# Patient Record
Sex: FEMALE | Race: AMERICAN INDIAN OR ALASKA NATIVE
[De-identification: names, ages, dates, MRNs, and addresses within clinical notes are randomized per-mention and may not be internally consistent; named-entity substitution may affect disease eponyms.]

---

## 2022-01-04 ENCOUNTER — HOSPITAL ENCOUNTER (EMERGENCY)
Dept: HOSPITAL 5 - ED | Age: 62
LOS: 1 days | Discharge: HOME | End: 2022-01-05
Payer: OTHER GOVERNMENT

## 2022-01-04 VITALS — SYSTOLIC BLOOD PRESSURE: 136 MMHG | DIASTOLIC BLOOD PRESSURE: 75 MMHG
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
## 2022-01-04 DIAGNOSIS — S62.618B: Primary | ICD-10-CM

## 2022-01-04 PROCEDURE — 99282 EMERGENCY DEPT VISIT SF MDM: CPT

## 2022-01-04 NOTE — EMERGENCY DEPARTMENT REPORT
ED Animal Bite HPI





- General


Chief Complaint: Extremity Injury, Upper


Stated Complaint: DOG BITE


Time Seen by Provider: 01/04/22 14:12


Source: patient


Mode of arrival: Ambulatory


Limitations: No Limitations





- History of Present Illness


Initial Comments: 





61-year-old -American female presents to the emergency room stating that 

she has been bitten by her dog while she was breaking up them from fighting.  

She states that her right hand was injured.  Patient went to [facility] and was 

noted to have an open fracture of her right middle proximal phalanx.  The wound 

dressed it and sent her over to be evaluated.  Patient reports that she has no 

known drug allergies.  She states she is up-to-date on her tetanus shot.


MD Complaint: animal bite


-: This afternoon


Right: Hand


Animal: dog


Description: household pet


Context: animals fighting


Associated Symptoms: bleeding


Treatments Prior to Arrival: wound dressing(s), irrigation





- Related Data


                                  Previous Rx's











 Medication  Instructions  Recorded  Last Taken  Type


 


Amoxicillin/Potassium Clav 1 each PO BID #20 tablet 01/04/22 Unknown Rx





[Augmentin 875-125 Tablet]    


 


cephALEXin [Keflex] 500 mg PO Q8HR #30 cap 01/04/22 Unknown Rx


 


traMADoL [Ultram 50 MG tab] 50 mg PO Q6HR PRN #12 tablet 01/04/22 Unknown Rx











                                    Allergies











Allergy/AdvReac Type Severity Reaction Status Date / Time


 


No Known Allergies Allergy   Unverified 01/04/22 14:35














ED Review of Systems


ROS: 


Stated complaint: DOG BITE


Other details as noted in HPI





Comment: All other systems reviewed and negative


Constitutional: no symptoms reported





ED Past Medical Hx





- Medications


Home Medications: 


                                Home Medications











 Medication  Instructions  Recorded  Confirmed  Last Taken  Type


 


Amoxicillin/Potassium Clav 1 each PO BID #20 tablet 01/04/22  Unknown Rx





[Augmentin 875-125 Tablet]     


 


cephALEXin [Keflex] 500 mg PO Q8HR #30 cap 01/04/22  Unknown Rx


 


traMADoL [Ultram 50 MG tab] 50 mg PO Q6HR PRN #12 tablet 01/04/22  Unknown Rx














ED Physical Exam





- General


Limitations: No Limitations


General appearance: alert, in no apparent distress





- Head


Head exam: Present: atraumatic, normocephalic





- Eye


Eye exam: Present: normal appearance





- ENT


ENT exam: Present: normal external ear exam





- Neck


Neck exam: Present: normal inspection, full ROM





- Respiratory


Respiratory exam: Absent: respiratory distress, accessory muscle use





- Cardiovascular


Cardiovascular Exam: Present: regular rate





- Back Exam


Back exam: Present: normal inspection





- Neurological Exam


Neurological exam: Present: alert, oriented X3, normal gait





- Psychiatric


Psychiatric exam: Present: normal affect, normal mood





ED Course


                                   Vital Signs











  01/04/22





  13:51


 


Temperature 98.6 F


 


Pulse Rate 75


 


Respiratory 15





Rate 


 


Blood Pressure 136/75


 


O2 Sat by Pulse 97





Oximetry 











Critical care attestation.: 


If time is entered above; I have spent that time in minutes in the direct care 

of this critically ill patient, excluding procedure time.








ED Disposition


Clinical Impression: 


Dog bite of hand


Qualifiers:


 Encounter type: initial encounter Laterality: right Qualified Code(s): S61.451A

- Open bite of right hand, initial encounter; W54.0XXA - Bitten by dog, initial 

encounter





Proximal phalanx fracture of finger


Qualifiers:


 Encounter type: initial encounter Finger: middle finger Fracture type: open 

Fracture alignment: displaced Laterality: unspecified laterality Qualified 

Code(s): S62.618B - Displaced fracture of proximal phalanx of other finger, 

initial encounter for open fracture





Disposition: 01 HOME / SELF CARE / HOMELESS


Is pt being admited?: No


Does the pt Need Aspirin: No


Condition: Stable


Instructions:  Animal Bite, Adult, Easy-to-Read, Finger Fracture, Adult, 

Easy-to-Read, Cast or Splint Care, Adult, Easy-to-Read


Additional Instructions: 


Please complete both antibiotics as prescribed.  Pain medication as needed.  Do 

not operate heavy machinery while taking pain medicine.  Is very important to 

increase your fluid intake.  Wear your splint.  Follow-up with an orthopedist in

the next 2 to 3 days.  I have listed several below for your convenience.


Prescriptions: 


Amoxicillin/Potassium Clav [Augmentin 875-125 Tablet] 1 each PO BID #20 tablet


cephALEXin [Keflex] 500 mg PO Q8HR #30 cap


traMADoL [Ultram 50 MG tab] 50 mg PO Q6HR PRN #12 tablet


 PRN Reason: Pain


Referrals: 


FABIANA BOYD MD [Staff Physician] - 3-5 Days


[facility] [Provider Group] - 3-5 Days


Forms:  Work/School Release Form(ED)


Time of Disposition: 14:41





ED Medical Decision Making





- Medical Decision Making





61-year-old -American female presents to the emergency room stating that 

she has been bitten by her dog while she was breaking up them from fighting.  

She states that her right hand was injured.  Patient went to [facility] and was 

noted to have an open fracture of her right middle proximal phalanx.  The wound 

dressed it and sent her over to be evaluated.  Patient reports that she has no 

known drug allergies.  She states she is up-to-date on her tetanus shot.








Patient will be referred to orthopedist.  She will be placed on Keflex Augmentin

placed in a finger splint and pain medication.  Patient is instructed to keep 

wound clean and dry.

## 2024-05-29 ENCOUNTER — OFFICE VISIT (OUTPATIENT)
Dept: URBAN - METROPOLITAN AREA CLINIC 118 | Facility: CLINIC | Age: 64
End: 2024-05-29

## 2024-07-22 ENCOUNTER — DASHBOARD ENCOUNTERS (OUTPATIENT)
Age: 64
End: 2024-07-22

## 2024-07-22 ENCOUNTER — OFFICE VISIT (OUTPATIENT)
Dept: URBAN - METROPOLITAN AREA CLINIC 118 | Facility: CLINIC | Age: 64
End: 2024-07-22
Payer: OTHER GOVERNMENT

## 2024-07-22 VITALS
TEMPERATURE: 97 F | HEIGHT: 67 IN | WEIGHT: 175.8 LBS | HEART RATE: 77 BPM | BODY MASS INDEX: 27.59 KG/M2 | DIASTOLIC BLOOD PRESSURE: 76 MMHG | SYSTOLIC BLOOD PRESSURE: 135 MMHG

## 2024-07-22 DIAGNOSIS — Z12.11 ENCOUNTER FOR SCREENING FOR MALIGNANT NEOPLASM OF COLON: ICD-10-CM

## 2024-07-22 PROCEDURE — 99203 OFFICE O/P NEW LOW 30 MIN: CPT | Performed by: INTERNAL MEDICINE

## 2024-07-22 RX ORDER — ERGOCALCIFEROL 1.25 MG/1
CAPSULE ORAL
Qty: 0 | Refills: 0 | Status: ACTIVE | COMMUNITY
Start: 1900-01-01

## 2024-07-22 NOTE — HPI-TODAY'S VISIT:
Patient here for routine colonoscopy evaluation. The patient denies specific GI complaints with no rectal bleeding, abdominal pain, or change in bowel habits  colon 6 years ago  Two sessile polyps were found in the rectum. The polyps were 3 to 5 mm in size. These polyps were removed with a hot snare. Resection and retrieval were complete. Internal hemorrhoids were found during retroflexion. The hemorrhoids were medium-sized. The exam was otherwise normal throughout the examined colon. was recommended repeat 5 years polyps were hyperplastic

## 2024-08-27 ENCOUNTER — OFFICE VISIT (OUTPATIENT)
Dept: URBAN - METROPOLITAN AREA SURGERY CENTER 23 | Facility: SURGERY CENTER | Age: 64
End: 2024-08-27

## 2024-09-03 ENCOUNTER — OFFICE VISIT (OUTPATIENT)
Dept: URBAN - METROPOLITAN AREA SURGERY CENTER 23 | Facility: SURGERY CENTER | Age: 64
End: 2024-09-03
Payer: OTHER GOVERNMENT

## 2024-09-03 ENCOUNTER — CLAIMS CREATED FROM THE CLAIM WINDOW (OUTPATIENT)
Dept: URBAN - METROPOLITAN AREA CLINIC 4 | Facility: CLINIC | Age: 64
End: 2024-09-03
Payer: OTHER GOVERNMENT

## 2024-09-03 DIAGNOSIS — K64.8 OTHER HEMORRHOIDS: ICD-10-CM

## 2024-09-03 DIAGNOSIS — Z12.11 COLON CANCER SCREENING: ICD-10-CM

## 2024-09-03 DIAGNOSIS — D12.4 BENIGN NEOPLASM OF DESCENDING COLON: ICD-10-CM

## 2024-09-03 DIAGNOSIS — D12.2 BENIGN NEOPLASM OF ASCENDING COLON: ICD-10-CM

## 2024-09-03 DIAGNOSIS — Z12.11 ENCOUNTER FOR SCREENING FOR MALIGNANT NEOPLASM OF COLON: ICD-10-CM

## 2024-09-03 PROCEDURE — 88305 TISSUE EXAM BY PATHOLOGIST: CPT | Performed by: PATHOLOGY

## 2024-09-03 PROCEDURE — 00811 ANES LWR INTST NDSC NOS: CPT | Performed by: NURSE ANESTHETIST, CERTIFIED REGISTERED

## 2024-09-03 PROCEDURE — 45385 COLONOSCOPY W/LESION REMOVAL: CPT | Performed by: INTERNAL MEDICINE

## 2024-09-03 RX ORDER — ERGOCALCIFEROL 1.25 MG/1
CAPSULE ORAL
Qty: 0 | Refills: 0 | Status: ACTIVE | COMMUNITY
Start: 1900-01-01